# Patient Record
Sex: MALE | Race: WHITE | NOT HISPANIC OR LATINO | Employment: FULL TIME | URBAN - METROPOLITAN AREA
[De-identification: names, ages, dates, MRNs, and addresses within clinical notes are randomized per-mention and may not be internally consistent; named-entity substitution may affect disease eponyms.]

---

## 2021-02-01 DIAGNOSIS — U07.1 COVID-19 VIRUS DETECTED: ICD-10-CM

## 2023-05-31 ENCOUNTER — TELEPHONE (OUTPATIENT)
Dept: GASTROENTEROLOGY | Facility: CLINIC | Age: 38
End: 2023-05-31
Payer: MEDICAID

## 2023-05-31 NOTE — TELEPHONE ENCOUNTER
Gave patient appt for 6/2/2023 at 2:00 pm.      ----- Message from Gary Gupta sent at 5/31/2023  1:40 PM CDT -----  Regarding: call back  Pt's mom call to schedule appt per referral    Call  or [phone]

## 2023-06-02 ENCOUNTER — OFFICE VISIT (OUTPATIENT)
Dept: GASTROENTEROLOGY | Facility: CLINIC | Age: 38
End: 2023-06-02
Payer: MEDICAID

## 2023-06-02 VITALS — BODY MASS INDEX: 33.37 KG/M2 | HEIGHT: 64 IN | WEIGHT: 195.44 LBS

## 2023-06-02 DIAGNOSIS — Z09 HOSPITAL DISCHARGE FOLLOW-UP: ICD-10-CM

## 2023-06-02 DIAGNOSIS — K21.9 GASTROESOPHAGEAL REFLUX DISEASE, UNSPECIFIED WHETHER ESOPHAGITIS PRESENT: ICD-10-CM

## 2023-06-02 DIAGNOSIS — R10.13 EPIGASTRIC PAIN: Primary | ICD-10-CM

## 2023-06-02 PROCEDURE — 3008F BODY MASS INDEX DOCD: CPT | Mod: CPTII,,, | Performed by: NURSE PRACTITIONER

## 2023-06-02 PROCEDURE — 99999 PR PBB SHADOW E&M-EST. PATIENT-LVL III: CPT | Mod: PBBFAC,,, | Performed by: NURSE PRACTITIONER

## 2023-06-02 PROCEDURE — 99204 PR OFFICE/OUTPT VISIT, NEW, LEVL IV, 45-59 MIN: ICD-10-PCS | Mod: S$PBB,,, | Performed by: NURSE PRACTITIONER

## 2023-06-02 PROCEDURE — 99999 PR PBB SHADOW E&M-EST. PATIENT-LVL III: ICD-10-PCS | Mod: PBBFAC,,, | Performed by: NURSE PRACTITIONER

## 2023-06-02 PROCEDURE — 3008F PR BODY MASS INDEX (BMI) DOCUMENTED: ICD-10-PCS | Mod: CPTII,,, | Performed by: NURSE PRACTITIONER

## 2023-06-02 PROCEDURE — 1159F PR MEDICATION LIST DOCUMENTED IN MEDICAL RECORD: ICD-10-PCS | Mod: CPTII,,, | Performed by: NURSE PRACTITIONER

## 2023-06-02 PROCEDURE — 99213 OFFICE O/P EST LOW 20 MIN: CPT | Mod: PBBFAC,PN | Performed by: NURSE PRACTITIONER

## 2023-06-02 PROCEDURE — 1159F MED LIST DOCD IN RCRD: CPT | Mod: CPTII,,, | Performed by: NURSE PRACTITIONER

## 2023-06-02 PROCEDURE — 99204 OFFICE O/P NEW MOD 45 MIN: CPT | Mod: S$PBB,,, | Performed by: NURSE PRACTITIONER

## 2023-06-02 RX ORDER — PANTOPRAZOLE SODIUM 40 MG/1
40 TABLET, DELAYED RELEASE ORAL 2 TIMES DAILY
Qty: 60 TABLET | Refills: 2 | Status: SHIPPED | OUTPATIENT
Start: 2023-06-02 | End: 2023-06-23 | Stop reason: ALTCHOICE

## 2023-06-02 RX ORDER — SUCRALFATE 1 G/1
1 TABLET ORAL 3 TIMES DAILY
Qty: 90 TABLET | Refills: 0 | Status: SHIPPED | OUTPATIENT
Start: 2023-06-02 | End: 2023-07-02

## 2023-06-02 NOTE — PROGRESS NOTES
GASTROENTEROLOGY CLINIC NOTE    Chief Complaint: The primary encounter diagnosis was Epigastric pain. Diagnoses of Hospital discharge follow-up and Gastroesophageal reflux disease, unspecified whether esophagitis present were also pertinent to this visit.  Referring provider/PCP: Rebecca De La Paz MD    Grey Angeles is a 38 y.o. male who is a new patient to me with a PMH that's significant for Hep C, GERD, and substance abuse. He is here today to establish care for epigastric abdominal pain and hospital follow up.   Pain has been ongoing for one week. Sought care two days ago at emergency room where he was evaluated, given GI cocktail, and Prilosec changed to Protonix.   Continues with constant stabbing epigastric pain.     Abdominal Pain  How Long: One week   Frequency: Daily, constant pain slightly eased up today   Location: epigastric   Quality: Stabbing/shocking pain   Onset: sudden   Radiate: no   Aggravated or Improved with bowel movement/eating/movement Worse with certain foods  No change with bowel movements  Movement can worsen pain, turning or deep breath   Nausea/Vomiting/Unexplained Weight Loss: No   Pyrosis/Reflux/Dysphagia: H/o reflux, worse since pain started   Bowel Movements: daily   Melena/Hematochezia: no    Symptoms: N/a     Treatments: Pantoprazole, Bentyl, Ibuprofen, Tylenol  NSAIDs: mobic 1-2 months ago  Caffeine: Energy Drinks  ETOH Use: one beer daily    Anticoagulation or Antiplatelet: No    History of H.pylori: no  H.pylori Treatment:  Prior Upper Endoscopy: no  Prior Colonoscopy: no  Family h/o Colon Cancer: No  Family h/o Crohn's Disease or Ulcerative Colitis: No  Family h/o Celiac Sprue: No  Abdominal Surgeries: no    Review of Systems   Constitutional:  Negative for weight loss.   HENT:  Negative for sore throat.    Eyes:  Negative for blurred vision.   Respiratory:  Negative for cough.    Cardiovascular:  Negative for chest pain.   Gastrointestinal:  Positive for abdominal pain  "(epigastric) and heartburn. Negative for blood in stool, constipation, diarrhea, melena, nausea and vomiting.   Genitourinary:  Negative for dysuria.   Musculoskeletal:  Negative for myalgias.   Skin:  Negative for rash.   Neurological:  Negative for headaches.   Endo/Heme/Allergies:  Negative for environmental allergies.   Psychiatric/Behavioral:  Negative for suicidal ideas. The patient is not nervous/anxious.      Past Medical History: has a past medical history of GERD (gastroesophageal reflux disease), Hepatitis, History of heroin abuse, and Kidney stones.    Past Surgical History: has no past surgical history on file.    Family History:family history is not on file.    Allergies: Review of patient's allergies indicates:  No Known Allergies    Social History: reports that he has been smoking cigarettes. He has been smoking an average of 1 pack per day. He has never used smokeless tobacco. He reports current alcohol use. He reports that he does not currently use drugs.    Home medications:   Current Outpatient Medications on File Prior to Visit   Medication Sig Dispense Refill    dicyclomine (BENTYL) 20 mg tablet Take 20 mg by mouth every 6 (six) hours.      methadone (DOLOPHINE) 5 MG tablet Take 5 mg by mouth every 6 (six) hours as needed for Pain.      pantoprazole (PROTONIX) 40 MG tablet Take 1 tablet (40 mg total) by mouth once daily. 30 tablet 1     No current facility-administered medications on file prior to visit.       Vital signs:  Ht 5' 4" (1.626 m)   Wt 88.6 kg (195 lb 7 oz)   BMI 33.55 kg/m²     Physical Exam  Vitals reviewed.   Constitutional:       General: He is not in acute distress.     Appearance: Normal appearance. He is not ill-appearing.   HENT:      Head: Normocephalic.   Cardiovascular:      Rate and Rhythm: Normal rate and regular rhythm.      Heart sounds: Normal heart sounds. No murmur heard.  Pulmonary:      Effort: Pulmonary effort is normal. No respiratory distress.      Breath " sounds: Normal breath sounds.   Chest:      Chest wall: No tenderness.   Abdominal:      General: Bowel sounds are normal. There is no distension.      Palpations: Abdomen is soft.      Tenderness: There is abdominal tenderness in the epigastric area. Negative signs include Moy's sign.      Hernia: No hernia is present.       Skin:     General: Skin is warm.   Neurological:      Mental Status: He is alert and oriented to person, place, and time.   Psychiatric:         Mood and Affect: Mood normal.         Behavior: Behavior normal.       Routine labs:  Lab Results   Component Value Date    WBC 8.61 05/31/2023    HGB 12.8 (L) 05/31/2023    HCT 38.6 (L) 05/31/2023    MCV 89 05/31/2023     05/31/2023     No results found for: INR  No results found for: IRON, FERRITIN, TIBC, FESATURATED  Lab Results   Component Value Date     05/31/2023    K 4.5 05/31/2023     05/31/2023    CO2 23 05/31/2023    BUN 15 05/31/2023    CREATININE 1.1 05/31/2023     Lab Results   Component Value Date    ALBUMIN 4.3 05/31/2023    ALT 20 05/31/2023    AST 27 05/31/2023    ALKPHOS 56 05/31/2023    BILITOT 0.3 05/31/2023     No results found for: GLUCOSE  No results found for: TSH  Lab Results   Component Value Date    CALCIUM 9.7 05/31/2023     Lab Results   Component Value Date    LIPASE 15 05/31/2023         Imaging:  CT Renal Stone Study ABD Pelvis WO  Narrative: EXAMINATION:  CT RENAL STONE STUDY ABD PELVIS WO    CLINICAL HISTORY:  Flank pain, kidney stone suspected;    TECHNIQUE:  Low dose axial images, sagittal and coronal reformations were obtained from the lung bases to the pubic symphysis.  Contrast was not administered.    COMPARISON:  CT of the abdomen pelvis performed 08/06/2022    FINDINGS:  Evaluation of the solid organs is limited due to lack of intravenous contrast.    Lower chest: Calcified granuloma noted.    URINARY COLLECTING SYSTEM: Nonobstructing 2-3 mm calculus right midpole.  No evidence of  obstructive uropathy.  Nonspecific bilateral perinephric stranding, as before.    Liver: Normal contour.  Subcentimeter hypoattenuating lesion left hepatic lobe too small to definitely characterize.    Gallbladder and bile ducts: Unremarkable.    Pancreas: Normal contour.    Spleen: Calcified granuloma    Adrenals: Normal contour.    Lymph nodes: Relatively similar mildly prominent mesenteric intracranial lymph nodes when compared to the 08/06/2022 CT.    Bowel and mesentery: No evidence of acute bowel obstruction.  Nonspecific submucosal fat attenuation involving colon could relate to sequela of chronic nonspecific inflammation.  Normal appearing appendix.    Abdominal aorta: Unremarkable.    Inferior vena cava: Unremarkable.    Free fluid or free air: None.    Pelvis: Unremarkable.    Body wall: Unremarkable.    Bones: No acute findings.  Impression: 1. Small nonobstructing right nephrolithiasis.  No evidence of obstructive uropathy.  2. Additional details, as provided in the body of report.    Electronically signed by: Rudy Zambrano  Date:    01/12/2023  Time:    11:33      I have reviewed prior labs, imaging, and notes.      Assessment:  1. Epigastric pain    2. Hospital discharge follow-up    3. Gastroesophageal reflux disease, unspecified whether esophagitis present      Sudden onset epigastric pain. ER workup unrevealing. Labs stable.   Worsening reflux since epigastric pain began.       Plan:  Orders Placed This Encounter    CT Abdomen Pelvis W Wo Contrast    pantoprazole (PROTONIX) 40 MG tablet    sucralfate (CARAFATE) 1 gram tablet    Case Request Endoscopy: EGD (ESOPHAGOGASTRODUODENOSCOPY)     CT Abdomen Pelvis w/wo Contrast  EGD  Increase Protonix to 40mg BID  Carafate 1g TID    Plan of care discussed with patient who is in agreement and verbalized understanding.     I have explained the planned procedures to the patient.The risks, benefits and alternatives of the procedure were also explained in  detail. Patient verbalized understanding, all questions were answered. The patient agrees to proceed as planned    Follow Up: As Needed Pending Workup          Joycelyn Yañez, STACY,FNP-BC  [facility]    (Portions of this note were dictated using voice recognition software and may contain dictation related errors in spelling/grammar/syntax not found on text review)

## 2023-06-05 ENCOUNTER — TELEPHONE (OUTPATIENT)
Dept: GASTROENTEROLOGY | Facility: CLINIC | Age: 38
End: 2023-06-05
Payer: MEDICAID

## 2023-06-08 ENCOUNTER — TELEPHONE (OUTPATIENT)
Dept: GASTROENTEROLOGY | Facility: CLINIC | Age: 38
End: 2023-06-08
Payer: MEDICAID

## 2023-06-08 NOTE — TELEPHONE ENCOUNTER
Patient notified and will follow up with his PCP.        ----- Message from Joycelyn Dickey NP sent at 6/8/2023 12:47 PM CDT -----  Please let patient know CT reviewed. No abnormal GI findings noted. There is a right kidney stone. I recommend he follow up with PCP for any additional workup. Small umbilical hernia which is not causing any problems. This can be monitored. Continue with EGD as scheduled.

## 2023-06-23 ENCOUNTER — TELEPHONE (OUTPATIENT)
Dept: GASTROENTEROLOGY | Facility: CLINIC | Age: 38
End: 2023-06-23
Payer: MEDICAID

## 2023-06-23 DIAGNOSIS — R10.13 EPIGASTRIC PAIN: ICD-10-CM

## 2023-06-23 DIAGNOSIS — K21.9 GASTROESOPHAGEAL REFLUX DISEASE, UNSPECIFIED WHETHER ESOPHAGITIS PRESENT: Primary | ICD-10-CM

## 2023-06-23 RX ORDER — PANTOPRAZOLE SODIUM 40 MG/1
40 TABLET, DELAYED RELEASE ORAL DAILY
Qty: 30 TABLET | Refills: 11 | Status: SHIPPED | OUTPATIENT
Start: 2023-06-23 | End: 2024-06-22

## 2023-06-23 NOTE — TELEPHONE ENCOUNTER
LVM for the patient that the Protonix rx is being changed to once a day in the am and he is to take the Prilosec OTC at night till he gets his scope done on 7/11/2023

## 2023-07-05 ENCOUNTER — TELEPHONE (OUTPATIENT)
Dept: GASTROENTEROLOGY | Facility: CLINIC | Age: 38
End: 2023-07-05
Payer: MEDICAID

## 2023-07-05 NOTE — TELEPHONE ENCOUNTER
Patient already talked to someone.    ----- Message from Quentin Nova MA sent at 7/3/2023  2:31 PM CDT -----    ----- Message -----  From: Tramaine Pickett  Sent: 7/3/2023   2:00 PM CDT  To: Franco Recio Staff    Type:  Needs Medical Advice    Who Called: Pt  Would the patient rather a call back or a response via [facility]? call  Best Call Back Number: [phone]  Additional Information: Pt would like a call from nurse in office regarding Outpatient Procedures on 07/10/2023 please call

## 2024-01-02 ENCOUNTER — TELEPHONE (OUTPATIENT)
Dept: GASTROENTEROLOGY | Facility: CLINIC | Age: 39
End: 2024-01-02
Payer: MEDICAID

## 2024-01-02 NOTE — TELEPHONE ENCOUNTER
LVM for the patient to call back to get his appt scheduled.      ----- Message from Emily Henriquez MA sent at 1/2/2024  1:48 PM CST -----  Regarding: FW: [facility] Colonoscopy  Contact: @ [phone]    ----- Message -----  From: Alyssa Love  Sent: 1/2/2024  11:47 AM CST  To: Pari Hirsch Staff  Subject: [facility] Colonoscopy                                  Pt is calling to speak to someone in the office to schedule procedure. Pt is asking for a return call soon.         Type of Procedure: Colonoscopy          Additional Information: Pt states that his PCP sent in the referral today

## 2024-01-02 NOTE — TELEPHONE ENCOUNTER
----- Message from Samantha Padron sent at 1/2/2024  3:12 PM CST -----  Regarding: Referral correction  Contact: [phone]  Blaire from Dr. Brown office  calling to speak with someone in provider office regarding referral. States  pt has Wrong diagnosis on referral . Referral diagnosis should be screening for colonoscopy,    Please call back at  [phone]

## 2024-01-02 NOTE — TELEPHONE ENCOUNTER
Returned call.   Aware the physicians at this location do not have any available medicaid slots at this time and we do not know when we will.   Sumi

## 2024-01-02 NOTE — TELEPHONE ENCOUNTER
Waiting on the referral to get his colonoscopy scheduled    ----- Message from Emily Henriquez MA sent at 1/2/2024  2:22 PM CST -----  Regarding: FW: Missed call  Contact: [phone]    ----- Message -----  From: Dread Ashraf  Sent: 1/2/2024   2:07 PM CST  To: [facility] Staff  Subject: Missed call                                      Caller is returning a missed called from office. Please call back as soon as possible.

## 2024-01-10 ENCOUNTER — TELEPHONE (OUTPATIENT)
Dept: SURGERY | Facility: CLINIC | Age: 39
End: 2024-01-10
Payer: MEDICAID

## 2024-01-10 DIAGNOSIS — Z12.11 SCREENING FOR COLON CANCER: Primary | ICD-10-CM

## 2024-01-10 RX ORDER — SODIUM, POTASSIUM,MAG SULFATES 17.5-3.13G
1 SOLUTION, RECONSTITUTED, ORAL ORAL DAILY
Qty: 1 KIT | Refills: 0 | Status: SHIPPED | OUTPATIENT
Start: 2024-01-10 | End: 2024-01-12

## 2024-01-10 RX ORDER — SODIUM CHLORIDE 0.9 % (FLUSH) 0.9 %
3 SYRINGE (ML) INJECTION
Status: CANCELLED | OUTPATIENT
Start: 2024-01-10

## 2024-01-10 NOTE — TELEPHONE ENCOUNTER
Called patient in reference to a referral to Colorectal Surgery for colon cancer screening. Patient verbally consented to a Colonoscopy and requested to be scheduled for a Colonoscopy on 01/29/2024 Patient was advised a designated  is required on the day of the Colonoscopy to drive the patient home and the  must be at least. 18 years old.Colonoscopy Prep instructions were thoroughly explained and discussed with the patient.It was emphasized, and reiterated to the patient, to please not to follow the bowel prep instructions that comes with the bowel prep package.However, to please follow the prep instructions that will be received in the mail,or via the [employer] portal, or by both modes of delivery, which ever method of delivery the patient prefers,from the [facility]   Patient acknowledges understanding Prep instructions as explained and discussed on the phone.. Patient was advised the Colonoscopy Prep instructions discussed and explained on the phone,are being mailed out to the patient's verified address on file,or put onto the [employer] portal,or both methods of delivery, whichever the patient prefers. Patient's address on file was verified with the patient for accuracy of mailing. Patient's medications on file was reviewed with the patient for accuracy of information. Patient denies taking  any other medications other than those listed and verified on medication profile.Patient was explained the Colonoscopy will be performed here at [facility]. Patient was further explained the Pre-Op will call one day prior to the procedure date, to discuss Pre-Op instructions;and what time to report for the Colonoscopy. The patient was given the opportunity to ask any questions about the Colonoscopy. No further issues were discussed.

## 2024-01-10 NOTE — TELEPHONE ENCOUNTER
The patient has been advised the Colonoscopy Prep Kit will be ordered from the patient's verified preferred pharmacy on file. The medication can  be picked up by the patient, or the patient's designated representative.The patient was further explained the Colonoscopy Prep instructions will be mailed to the patient verified mailing address on file, or put onto the [employer] portal, whichever method of delivery the patient prefers.Additionally this patient was informed,not to follow the instructions that comes with the bowel prep medication. However, the patient was instructed to please follow the Colonoscopy Bowel Prep instructions that's being provided by the . The patient was asked to please to follow the Colonoscopy Prep instructions being provided as precisely,and  meticulously as possible.The patient was advised you  will receive a follow up phone call to summarize the Colonoscopy Prep instructions prior to the scheduled Colonoscopy procedure date. At this time the patient will be given an opportunity to ask any questions regarding the Colonoscopy procedure, and it's associated Bowel Prep instructions.

## 2024-01-30 ENCOUNTER — TELEPHONE (OUTPATIENT)
Dept: GASTROENTEROLOGY | Facility: CLINIC | Age: 39
End: 2024-01-30
Payer: MEDICAID

## 2024-01-30 NOTE — TELEPHONE ENCOUNTER
Left a message for the patient to call back.      ----- Message from Michael Bhandari MA sent at 1/30/2024  1:15 PM CST -----    ----- Message -----  From: Dagmar Dent  Sent: 1/30/2024  12:22 PM CST  To: Kamari GOODWIN Staff    Type:  Needs Medical Advice    Who Called: pt    Would the patient rather a call back or a response via [facility]? Call   Best Call Back Number: [phone]  Additional Information: pt reporting abdominal and groin pain after colonoscopy would like a call back

## 2024-02-01 ENCOUNTER — TELEPHONE (OUTPATIENT)
Dept: GASTROENTEROLOGY | Facility: CLINIC | Age: 39
End: 2024-02-01
Payer: MEDICAID

## 2024-02-01 NOTE — TELEPHONE ENCOUNTER
Patient notified and understands    ----- Message from Marcio Benites MD sent at 1/31/2024  4:31 PM CST -----  Please inform , not active on portal  Grey Angeles,    The colon polyp(s) were benign polyps(s) called adenomatous polyp. This is a precancerous polyp. It does not contain cancer, but has the potential to turn into cancer over time. This polyp has been removed but you may develop more polyps in the future. It is important to keep your previously recommended interval for repeat colonoscopy -3 years.    Let us know if you have questions.

## 2024-07-30 ENCOUNTER — OFFICE VISIT (OUTPATIENT)
Dept: GASTROENTEROLOGY | Facility: CLINIC | Age: 39
End: 2024-07-30
Payer: MEDICAID

## 2024-07-30 ENCOUNTER — TELEPHONE (OUTPATIENT)
Dept: GASTROENTEROLOGY | Facility: CLINIC | Age: 39
End: 2024-07-30
Payer: MEDICAID

## 2024-07-30 DIAGNOSIS — R10.13 EPIGASTRIC PAIN: ICD-10-CM

## 2024-07-30 DIAGNOSIS — K21.9 GASTROESOPHAGEAL REFLUX DISEASE, UNSPECIFIED WHETHER ESOPHAGITIS PRESENT: ICD-10-CM

## 2024-07-30 PROCEDURE — 1159F MED LIST DOCD IN RCRD: CPT | Mod: CPTII,95,, | Performed by: NURSE PRACTITIONER

## 2024-07-30 PROCEDURE — 99214 OFFICE O/P EST MOD 30 MIN: CPT | Mod: 95,,, | Performed by: NURSE PRACTITIONER

## 2024-07-30 PROCEDURE — 1160F RVW MEDS BY RX/DR IN RCRD: CPT | Mod: CPTII,95,, | Performed by: NURSE PRACTITIONER

## 2024-07-30 RX ORDER — OMEPRAZOLE 40 MG/1
40 CAPSULE, DELAYED RELEASE ORAL DAILY
Qty: 30 CAPSULE | Refills: 11 | Status: SHIPPED | OUTPATIENT
Start: 2024-07-30 | End: 2025-07-30

## 2024-07-30 NOTE — TELEPHONE ENCOUNTER
-[facility] PATIENT APPT FOR 9/27/2024 AT 1:40 PM      ---- Message from Joycelyn Yañez NP sent at 7/30/2024  3:38 PM CDT -----  Regarding: follow up  Can you schedule follow up for reflux in 8-10 weeks?

## 2024-07-30 NOTE — PROGRESS NOTES
GASTROENTEROLOGY CLINIC NOTE    Chief Complaint: Diagnoses of Gastroesophageal reflux disease, unspecified whether esophagitis present and Epigastric pain were pertinent to this visit.  Referring provider/PCP: Rebecca De La Paz MD    Grey Angeles is a 39 y.o. male who is a new patient to me with a PMH that's significant for Hep C, GERD, and substance abuse. He is here today to establish care for epigastric abdominal pain and hospital follow up.   Pain has been ongoing for one week. Sought care two days ago at emergency room where he was evaluated, given GI cocktail, and Prilosec changed to Protonix.   Continues with constant stabbing epigastric pain.     Abdominal Pain  How Long: One week   Frequency: Daily, constant pain slightly eased up today   Location: epigastric   Quality: Stabbing/shocking pain   Onset: sudden   Radiate: no   Aggravated or Improved with bowel movement/eating/movement Worse with certain foods  No change with bowel movements  Movement can worsen pain, turning or deep breath   Nausea/Vomiting/Unexplained Weight Loss: No   Pyrosis/Reflux/Dysphagia: H/o reflux, worse since pain started   Bowel Movements: daily   Melena/Hematochezia: no    Symptoms: N/a     Treatments: Pantoprazole, Bentyl, Ibuprofen, Tylenol  NSAIDs: mobic 1-2 months ago  Caffeine: Energy Drinks  ETOH Use: one beer daily    __________________________________________________  Interval Note 7/30/2024    The patient location is: [place]  The chief complaint leading to consultation is: Medication Refill, Follow up reflux    Visit type: audiovisual    Face to Face time with patient: 9:00  20 minutes of total time spent on the encounter, which includes face to face time and non-face to face time preparing to see the patient (eg, review of tests), Obtaining and/or reviewing separately obtained history, Documenting clinical information in the electronic or other health record, Independently interpreting results (not separately  reported) and communicating results to the patient/family/caregiver, or Care coordination (not separately reported).     Each patient to whom he or she provides medical services by telemedicine is:  (1) informed of the relationship between the physician and patient and the respective role of any other health care provider with respect to management of the patient; and (2) notified that he or she may decline to receive medical services by telemedicine and may withdraw from such care at any time.    Notes:      Grey Angeles presents via telemedicine encounter for medication refill and follow up regarding acid reflux. Last seen 6/2023. Protonix initiated. Protonix helping but continues to have breakthrough symptoms despite daily PPI. He is supplementing with Mylanta. Has breakthrough symptoms about 3 days a week. No other complaints.     NSAIDS: No  ETOH: 2 beers a day  Anticoagulation or Antiplatelet: No    History of H.pylori: no  H.pylori Treatment:  Prior Upper Endoscopy: 2023 with Dr. Gamez  Impression:            - Normal esophagus.                          - Erythematous mucosa in the antrum. Biopsied.                          - Normal examined duodenum.     Recommendation:        - Discharge patient to home.                          - Resume previous diet.                          - Continue present medications.                          - Await pathology results.                          - Return to nurse practitioner PRN.     Pathology:  Stomach, random, biopsy:   - Antral mucosa with regenerative/reactive gastropathy.   - Oxyntic mucosa with no significant histologic abnormality.    - Immunostain for H.pylori is negative,     Prior Colonoscopy: 2024 with Dr. Benites  Impression:            - One 9 mm polyp at the recto-sigmoid colon,                          removed with a cold snare. Resected and retrieved.                          Clips were placedx2.                          - The examination was  otherwise normal on direct                          and retroflexion views.     Recommendation:        - Discharge patient to home.                          - Patient has a contact number available for                          emergencies. The signs and symptoms of potential                          delayed complications were discussed with the                          patient. Return to normal activities tomorrow.                          Written discharge instructions were provided to                          the patient.                          - Resume previous diet.                          - Continue present medications.                          - Await pathology results.                          - Repeat colonoscopy in 3 years     Pathology:  Rectosigmoid colon, polyp, biopsy:   Tubular adenoma, fragmented.     Family h/o Colon Cancer: No  Family h/o Crohn's Disease or Ulcerative Colitis: No  Family h/o Celiac Sprue: No  Abdominal Surgeries: no    Review of Systems   Constitutional:  Negative for weight loss.   HENT:  Negative for sore throat.    Eyes:  Negative for blurred vision.   Respiratory:  Negative for cough.    Cardiovascular:  Negative for chest pain.   Gastrointestinal:  Positive for abdominal pain (epigastric) and heartburn. Negative for blood in stool, constipation, diarrhea, melena, nausea and vomiting.   Genitourinary:  Negative for dysuria.   Musculoskeletal:  Negative for myalgias.   Skin:  Negative for rash.   Neurological:  Negative for headaches.   Endo/Heme/Allergies:  Negative for environmental allergies.   Psychiatric/Behavioral:  Negative for suicidal ideas. The patient is not nervous/anxious.        Past Medical History: has a past medical history of GERD (gastroesophageal reflux disease), Hepatitis, History of heroin abuse, and Kidney stones.    Past Surgical History: has a past surgical history that includes Esophagogastroduodenoscopy (07/10/2023); Esophagogastroduodenoscopy (N/A,  07/10/2023); Colonoscopy w/ polypectomy (01/29/2024); and Colonoscopy (N/A, 1/29/2024).    Family History:family history is not on file.    Allergies: Review of patient's allergies indicates:  No Known Allergies    Social History: reports that he has been smoking cigarettes. He has never used smokeless tobacco. He reports current alcohol use. He reports that he does not currently use drugs.    Home medications:   Current Outpatient Medications on File Prior to Visit   Medication Sig Dispense Refill    dicyclomine (BENTYL) 20 mg tablet Take 20 mg by mouth every 6 (six) hours.      methadone (DOLOPHINE) 5 MG tablet Take 5 mg by mouth every 6 (six) hours as needed for Pain.      [DISCONTINUED] pantoprazole (PROTONIX) 40 MG tablet Take 1 tablet (40 mg total) by mouth once daily. 30 tablet 11     Current Facility-Administered Medications on File Prior to Visit   Medication Dose Route Frequency Provider Last Rate Last Admin    0.9%  NaCl infusion   Intravenous Continuous Hemal Gamez MD        LIDOcaine (PF) 10 mg/ml (1%) injection 10 mg  1 mL Intradermal Once PRN Hemal Gamez MD           Vital signs:  There were no vitals taken for this visit.    Physical Exam  Constitutional:       General: He is not in acute distress.     Appearance: Normal appearance. He is not ill-appearing.      Comments: Limited Physical Exam d/t Telemedicine Encounter   HENT:      Head: Normocephalic.   Pulmonary:      Effort: Pulmonary effort is normal. No respiratory distress.   Abdominal:      Tenderness: Negative signs include Moy's sign.   Neurological:      Mental Status: He is alert and oriented to person, place, and time.   Psychiatric:         Mood and Affect: Mood normal.         Behavior: Behavior normal.         Thought Content: Thought content normal.         Judgment: Judgment normal.         Routine labs:  Lab Results   Component Value Date    WBC 13.18 (H) 05/20/2024    HGB 13.9 (L) 05/20/2024    HCT  "40.8 05/20/2024    MCV 91 05/20/2024     05/20/2024     No results found for: "INR"  No results found for: "IRON", "FERRITIN", "TIBC", "FESATURATED"  Lab Results   Component Value Date     05/20/2024    K 3.8 05/20/2024     05/20/2024    CO2 22 (L) 05/20/2024    BUN 11 05/20/2024    CREATININE 1.6 (H) 05/20/2024     Lab Results   Component Value Date    ALBUMIN 4.8 05/20/2024    ALT 33 05/20/2024    AST 33 05/20/2024    ALKPHOS 64 05/20/2024    BILITOT 0.6 05/20/2024     Lab Results   Component Value Date    GLUCOSE 79 06/07/2022     Lab Results   Component Value Date    TSH 2.65 06/07/2022     Lab Results   Component Value Date    CALCIUM 10.1 05/20/2024     Lab Results   Component Value Date    LIPASE 101 (H) 05/20/2024         Imaging:  CT Abdomen Pelvis With IV Contrast NO Oral Contrast  Narrative: EXAMINATION:  CT ABDOMEN PELVIS WITH IV CONTRAST    CLINICAL HISTORY:  Abdominal pain, acute, nonlocalized;    TECHNIQUE:  Low dose axial images, sagittal and coronal reformations were obtained from the lung bases to the pubic symphysis following the IV administration of 100 mL of Omnipaque 350 .  Oral contrast was not given.    COMPARISON:  CT, 06/07/2023.    FINDINGS:  Lower Chest:    Lung bases are essentially clear.  Heart size is stable.  No pleural or pericardial effusion.    Abdomen:    Liver is stable in size and contour.  Subcentimeter hypodensity again noted in the left hepatic lobe, too small to definitively characterize, though likely a simple cyst.  Possible mild hepatic steatosis.  Gallbladder is unremarkable. No intrahepatic biliary ductal dilatation.    Spleen is at the upper limits of normal in size, otherwise unremarkable.  Adrenal glands are unremarkable.  Diffuse peripancreatic fat stranding and minimal peripancreatic free fluid, suggestive of acute uncomplicated pancreatitis.  No vascular complication or pancreatic necrosis.  No organized peripancreatic fluid collection.    The " kidneys are symmetric.  No hydronephrosis. No asymmetric perinephric fat stranding.    No small bowel obstruction.  Small duodenal diverticulum.  Mucosal fat deposition in the colon.  No inflammatory changes identified in bowel.  Appendix is normal.    No pneumoperitoneum or organized fluid collection.    Borderline enlarged peripancreatic and periportal lymph nodes.  No bulky retroperitoneal lymphadenopathy.    Abdominal aorta is normal in caliber without significant calcific atherosclerosis.    Portal, splenic, and superior mesenteric veins are patent. No portal venous gas.    Pelvis:    Urinary bladder is decompressed and not well evaluated.  Prostate and rectum are unremarkable.  No significant pelvic free fluid.  No bulky pelvic lymphadenopathy.    Bones and soft tissues:    No aggressive osseous lesions.  Extraperitoneal soft tissues are negative for acute finding.  Impression: Acute uncomplicated pancreatitis.    This report was flagged in Epic as abnormal.    Electronically signed by: Reyes Hamilton MD  Date:    05/20/2024  Time:    23:13      I have reviewed prior labs, imaging, and notes.      Assessment:  1. Gastroesophageal reflux disease, unspecified whether esophagitis present    2. Epigastric pain      Reflux better with Protonix but continues to have breakthrough symptoms a few days a week where he is having to supplement with Mylanta.     EGD 2023 with Dr. Gamez unrevealing.     CT 5/2024 significant for pancreatitis. ETOH use of two 24 ounce beers daily.          Plan:  Orders Placed This Encounter    omeprazole (PRILOSEC) 40 MG capsule     Class switch PPI  Omeprazole 40mg daily    Will discuss with collaborative physician whether or not EUS warranted due to recent CT findings.     Plan of care discussed with patient who is in agreement and verbalized understanding.     I have explained the planned procedures to the patient.The risks, benefits and alternatives of the procedure were also  explained in detail. Patient verbalized understanding, all questions were answered. The patient agrees to proceed as planned    Follow Up: 10 weeks          STACY Valladares,FNP-BC  [facility]    (Portions of this note were dictated using voice recognition software and may contain dictation related errors in spelling/grammar/syntax not found on text review)

## 2024-07-30 NOTE — TELEPHONE ENCOUNTER
Gave patient an appt with Joycelyn Dickey for 7/30/2024 virtual to get medications refilled     ----- Message from Quentin Nova MA sent at 7/30/2024  9:46 AM CDT -----  Contact: [phone]    ----- Message -----  From: Kristine Colby  Sent: 7/30/2024   9:24 AM CDT  To: P[facility] Hirsch Staff    Type:  Sooner Apoointment Request  Caller is requesting a sooner appointment.  Name of Caller:Grey  When is the first available appointment?Sooner  Symptoms:Acid reflux And refill of medication   Would the patient rather a call back or a response via [facility]? Call  Best Call Back Number: [phone]  Additional Information:

## 2024-07-30 NOTE — Clinical Note
Followed up with patient for med refill for reflux. I ended up switching to omeprazole b/c having breakthrough symptoms few days a week with Protonix.  Had CT 5/2024 which indicates pancreatitis. Lipase was 101. EGD 2023 without any concerning findings. ETOH use two 24 ounce beers daily. No GLP-1s. Should I order an EUS or would the alcohol use have caused the pancreatitis?

## 2024-09-27 ENCOUNTER — OFFICE VISIT (OUTPATIENT)
Dept: GASTROENTEROLOGY | Facility: CLINIC | Age: 39
End: 2024-09-27
Payer: MEDICAID

## 2024-09-27 VITALS
BODY MASS INDEX: 34.72 KG/M2 | OXYGEN SATURATION: 96 % | WEIGHT: 202.25 LBS | HEART RATE: 80 BPM | DIASTOLIC BLOOD PRESSURE: 89 MMHG | RESPIRATION RATE: 18 BRPM | SYSTOLIC BLOOD PRESSURE: 137 MMHG

## 2024-09-27 DIAGNOSIS — R10.13 EPIGASTRIC PAIN: ICD-10-CM

## 2024-09-27 DIAGNOSIS — K21.9 GASTROESOPHAGEAL REFLUX DISEASE, UNSPECIFIED WHETHER ESOPHAGITIS PRESENT: Primary | ICD-10-CM

## 2024-09-27 PROCEDURE — 99213 OFFICE O/P EST LOW 20 MIN: CPT | Mod: PBBFAC,PN | Performed by: NURSE PRACTITIONER

## 2024-09-27 PROCEDURE — 99999 PR PBB SHADOW E&M-EST. PATIENT-LVL III: CPT | Mod: PBBFAC,,, | Performed by: NURSE PRACTITIONER

## 2024-09-27 RX ORDER — AMOXICILLIN 500 MG/1
500 TABLET, FILM COATED ORAL 3 TIMES DAILY
COMMUNITY
Start: 2024-09-03

## 2024-09-27 NOTE — PROGRESS NOTES
GASTROENTEROLOGY CLINIC NOTE    Chief Complaint: The primary encounter diagnosis was Gastroesophageal reflux disease, unspecified whether esophagitis present. A diagnosis of Epigastric pain was also pertinent to this visit.  Referring provider/PCP: Rebecca De La Paz MD    Grey Angeles is a 39 y.o. male who is a new patient to me with a PMH that's significant for Hep C, GERD, and substance abuse. He is here today to establish care for epigastric abdominal pain and hospital follow up.   Pain has been ongoing for one week. Sought care two days ago at emergency room where he was evaluated, given GI cocktail, and Prilosec changed to Protonix.   Continues with constant stabbing epigastric pain.     Abdominal Pain  How Long: One week   Frequency: Daily, constant pain slightly eased up today   Location: epigastric   Quality: Stabbing/shocking pain   Onset: sudden   Radiate: no   Aggravated or Improved with bowel movement/eating/movement Worse with certain foods  No change with bowel movements  Movement can worsen pain, turning or deep breath   Nausea/Vomiting/Unexplained Weight Loss: No   Pyrosis/Reflux/Dysphagia: H/o reflux, worse since pain started   Bowel Movements: daily   Melena/Hematochezia: no    Symptoms: N/a     Treatments: Pantoprazole, Bentyl, Ibuprofen, Tylenol  NSAIDs: mobic 1-2 months ago  Caffeine: Energy Drinks  ETOH Use: one beer daily    __________________________________________________  Interval Note 7/30/2024    Grey Angeles presents via telemedicine encounter for medication refill and follow up regarding acid reflux. Last seen 6/2023. Protonix initiated. Protonix helping but continues to have breakthrough symptoms despite daily PPI. He is supplementing with Mylanta. Has breakthrough symptoms about 3 days a week. No other complaints.     _________________________________________________________________________  Interval Note 9/27/2024    Grey Angeles who is known to me presents to clinic for  follow up regarding reflux.   Protonix switched to omeprazole at last office visit as he was having breakthrough symptoms a few times a week.   Does not find omeprazole is much better with controlling breakthrough symptoms. Occurring 2-3 days a week mostly late at night or early morning.   Avoiding late meals. Mylanta helps for breakthrough symptoms.       NSAIDS: No  ETOH: 2 beers a day  Anticoagulation or Antiplatelet: No    History of H.pylori: no  H.pylori Treatment:  Prior Upper Endoscopy: 2023 with Dr. Gamez  Impression:            - Normal esophagus.                          - Erythematous mucosa in the antrum. Biopsied.                          - Normal examined duodenum.     Recommendation:        - Discharge patient to home.                          - Resume previous diet.                          - Continue present medications.                          - Await pathology results.                          - Return to nurse practitioner PRN.     Pathology:  Stomach, random, biopsy:   - Antral mucosa with regenerative/reactive gastropathy.   - Oxyntic mucosa with no significant histologic abnormality.    - Immunostain for H.pylori is negative,     Prior Colonoscopy: 2024 with Dr. Benites  Impression:            - One 9 mm polyp at the recto-sigmoid colon,                          removed with a cold snare. Resected and retrieved.                          Clips were placedx2.                          - The examination was otherwise normal on direct                          and retroflexion views.     Recommendation:        - Discharge patient to home.                          - Patient has a contact number available for                          emergencies. The signs and symptoms of potential                          delayed complications were discussed with the                          patient. Return to normal activities tomorrow.                          Written discharge instructions were provided to                           the patient.                          - Resume previous diet.                          - Continue present medications.                          - Await pathology results.                          - Repeat colonoscopy in 3 years     Pathology:  Rectosigmoid colon, polyp, biopsy:   Tubular adenoma, fragmented.     Family h/o Colon Cancer: No  Family h/o Crohn's Disease or Ulcerative Colitis: No  Family h/o Celiac Sprue: No  Abdominal Surgeries: no    Review of Systems   Constitutional:  Negative for weight loss.   HENT:  Negative for sore throat.    Eyes:  Negative for blurred vision.   Respiratory:  Negative for cough.    Cardiovascular:  Negative for chest pain.   Gastrointestinal:  Positive for abdominal pain (epigastric) and heartburn. Negative for blood in stool, constipation, diarrhea, melena, nausea and vomiting.   Genitourinary:  Negative for dysuria.   Musculoskeletal:  Negative for myalgias.   Skin:  Negative for rash.   Neurological:  Negative for headaches.   Endo/Heme/Allergies:  Negative for environmental allergies.   Psychiatric/Behavioral:  Negative for suicidal ideas. The patient is not nervous/anxious.        Past Medical History: has a past medical history of GERD (gastroesophageal reflux disease), Hepatitis, History of heroin abuse, and Kidney stones.    Past Surgical History: has a past surgical history that includes Esophagogastroduodenoscopy (07/10/2023); Esophagogastroduodenoscopy (N/A, 07/10/2023); Colonoscopy w/ polypectomy (01/29/2024); and Colonoscopy (N/A, 1/29/2024).    Family History:family history is not on file.    Allergies: Review of patient's allergies indicates:  No Known Allergies    Social History: reports that he has been smoking cigarettes. He has never used smokeless tobacco. He reports current alcohol use. He reports that he does not currently use drugs.    Home medications:   Current Outpatient Medications on File Prior to Visit   Medication Sig Dispense  "Refill    amoxicillin (AMOXIL) 500 MG Tab Take 500 mg by mouth 3 (three) times daily.      methadone (DOLOPHINE) 5 MG tablet Take 5 mg by mouth every 6 (six) hours as needed for Pain.      omeprazole (PRILOSEC) 40 MG capsule Take 1 capsule (40 mg total) by mouth once daily. 30 capsule 11    [DISCONTINUED] dicyclomine (BENTYL) 20 mg tablet Take 20 mg by mouth every 6 (six) hours. (Patient not taking: Reported on 9/27/2024)       Current Facility-Administered Medications on File Prior to Visit   Medication Dose Route Frequency Provider Last Rate Last Admin    0.9%  NaCl infusion   Intravenous Continuous Hemal Gamez MD        LIDOcaine (PF) 10 mg/ml (1%) injection 10 mg  1 mL Intradermal Once PRN Hemal Gamez MD           Vital signs:  /89 (BP Location: Right arm, Patient Position: Sitting, BP Method: Medium (Automatic))   Pulse 80   Resp 18   Wt 91.7 kg (202 lb 4.4 oz)   SpO2 96%   BMI 34.72 kg/m²     Physical Exam  Constitutional:       General: He is not in acute distress.     Appearance: Normal appearance. He is not ill-appearing.      Comments: Limited Physical Exam d/t Telemedicine Encounter   HENT:      Head: Normocephalic.   Pulmonary:      Effort: Pulmonary effort is normal. No respiratory distress.   Abdominal:      Tenderness: Negative signs include Moy's sign.   Neurological:      Mental Status: He is alert and oriented to person, place, and time.   Psychiatric:         Mood and Affect: Mood normal.         Behavior: Behavior normal.         Thought Content: Thought content normal.         Judgment: Judgment normal.         Routine labs:  Lab Results   Component Value Date    WBC 13.18 (H) 05/20/2024    HGB 13.9 (L) 05/20/2024    HCT 40.8 05/20/2024    MCV 91 05/20/2024     05/20/2024     No results found for: "INR"  No results found for: "IRON", "FERRITIN", "TIBC", "FESATURATED"  Lab Results   Component Value Date     05/20/2024    K 3.8 05/20/2024    "  05/20/2024    CO2 22 (L) 05/20/2024    BUN 11 05/20/2024    CREATININE 1.6 (H) 05/20/2024     Lab Results   Component Value Date    ALBUMIN 4.8 05/20/2024    ALT 33 05/20/2024    AST 33 05/20/2024    ALKPHOS 64 05/20/2024    BILITOT 0.6 05/20/2024     Lab Results   Component Value Date    GLUCOSE 79 06/07/2022     Lab Results   Component Value Date    TSH 2.65 06/07/2022     Lab Results   Component Value Date    CALCIUM 10.1 05/20/2024     Lab Results   Component Value Date    LIPASE 101 (H) 05/20/2024         Imaging:  CT Abdomen Pelvis With IV Contrast NO Oral Contrast  Narrative: EXAMINATION:  CT ABDOMEN PELVIS WITH IV CONTRAST    CLINICAL HISTORY:  Abdominal pain, acute, nonlocalized;    TECHNIQUE:  Low dose axial images, sagittal and coronal reformations were obtained from the lung bases to the pubic symphysis following the IV administration of 100 mL of Omnipaque 350 .  Oral contrast was not given.    COMPARISON:  CT, 06/07/2023.    FINDINGS:  Lower Chest:    Lung bases are essentially clear.  Heart size is stable.  No pleural or pericardial effusion.    Abdomen:    Liver is stable in size and contour.  Subcentimeter hypodensity again noted in the left hepatic lobe, too small to definitively characterize, though likely a simple cyst.  Possible mild hepatic steatosis.  Gallbladder is unremarkable. No intrahepatic biliary ductal dilatation.    Spleen is at the upper limits of normal in size, otherwise unremarkable.  Adrenal glands are unremarkable.  Diffuse peripancreatic fat stranding and minimal peripancreatic free fluid, suggestive of acute uncomplicated pancreatitis.  No vascular complication or pancreatic necrosis.  No organized peripancreatic fluid collection.    The kidneys are symmetric.  No hydronephrosis. No asymmetric perinephric fat stranding.    No small bowel obstruction.  Small duodenal diverticulum.  Mucosal fat deposition in the colon.  No inflammatory changes identified in bowel.   Appendix is normal.    No pneumoperitoneum or organized fluid collection.    Borderline enlarged peripancreatic and periportal lymph nodes.  No bulky retroperitoneal lymphadenopathy.    Abdominal aorta is normal in caliber without significant calcific atherosclerosis.    Portal, splenic, and superior mesenteric veins are patent. No portal venous gas.    Pelvis:    Urinary bladder is decompressed and not well evaluated.  Prostate and rectum are unremarkable.  No significant pelvic free fluid.  No bulky pelvic lymphadenopathy.    Bones and soft tissues:    No aggressive osseous lesions.  Extraperitoneal soft tissues are negative for acute finding.  Impression: Acute uncomplicated pancreatitis.    This report was flagged in Epic as abnormal.    Electronically signed by: Reyes Hamilton MD  Date:    05/20/2024  Time:    23:13      I have reviewed prior labs, imaging, and notes.      Assessment:  1. Gastroesophageal reflux disease, unspecified whether esophagitis present    2. Epigastric pain        continues to have breakthrough symptoms a few days a week where he is having to supplement with Mylanta.   Has tried both Protonix and Omeprazole.     EGD 2023 with Dr. Gamez unrevealing.     CT 5/2024 significant for pancreatitis. ETOH use of two 24 ounce beers daily.          Plan:  Orders Placed This Encounter    vonoprazan 10 mg Tab     Voquezna 10mg daily.      Plan of care discussed with patient who is in agreement and verbalized understanding.     I have explained the planned procedures to the patient.The risks, benefits and alternatives of the procedure were also explained in detail. Patient verbalized understanding, all questions were answered. The patient agrees to proceed as planned    Follow Up: 10 weeks after starting voquezna          Joycelyn Yañez, STACY,FNP-BC  [facility]    (Portions of this note were dictated using voice recognition software and may contain dictation  related errors in spelling/grammar/syntax not found on text review)

## 2024-10-07 DIAGNOSIS — K21.9 GASTROESOPHAGEAL REFLUX DISEASE, UNSPECIFIED WHETHER ESOPHAGITIS PRESENT: Primary | ICD-10-CM

## 2024-10-07 RX ORDER — LANSOPRAZOLE 30 MG/1
30 CAPSULE, DELAYED RELEASE ORAL DAILY
Qty: 30 CAPSULE | Refills: 11 | Status: SHIPPED | OUTPATIENT
Start: 2024-10-07 | End: 2025-10-07

## 2024-10-07 NOTE — PROGRESS NOTES
Patient has failed Protonix and omeprazole.  Attempted to get voquezna covered. PA denied b/c he does not have H.pylori.     Will trial Prevacid 30mg daily. Prescription sent to pharmacy.

## 2024-10-08 ENCOUNTER — TELEPHONE (OUTPATIENT)
Dept: GASTROENTEROLOGY | Facility: CLINIC | Age: 39
End: 2024-10-08
Payer: MEDICAID